# Patient Record
Sex: MALE | Race: OTHER | Employment: FULL TIME | URBAN - METROPOLITAN AREA
[De-identification: names, ages, dates, MRNs, and addresses within clinical notes are randomized per-mention and may not be internally consistent; named-entity substitution may affect disease eponyms.]

---

## 2018-12-22 ENCOUNTER — HOSPITAL ENCOUNTER (EMERGENCY)
Age: 28
Discharge: HOME | End: 2018-12-22
Attending: EMERGENCY MEDICINE
Payer: SELF-PAY

## 2018-12-22 VITALS
RESPIRATION RATE: 16 BRPM | HEART RATE: 71 BPM | HEIGHT: 65 IN | DIASTOLIC BLOOD PRESSURE: 79 MMHG | OXYGEN SATURATION: 98 % | TEMPERATURE: 97.9 F | SYSTOLIC BLOOD PRESSURE: 134 MMHG | WEIGHT: 181.22 LBS | BODY MASS INDEX: 30.19 KG/M2

## 2018-12-22 DIAGNOSIS — H18.892 CORNEAL RUST RING OF LEFT EYE: ICD-10-CM

## 2018-12-22 DIAGNOSIS — T15.92XA FOREIGN BODY OF LEFT EYE, INITIAL ENCOUNTER: Primary | ICD-10-CM

## 2018-12-22 PROCEDURE — 99282 EMERGENCY DEPT VISIT SF MDM: CPT

## 2018-12-22 PROCEDURE — 74011000250 HC RX REV CODE- 250: Performed by: NURSE PRACTITIONER

## 2018-12-22 PROCEDURE — 75810000285 HC RMVL FB EYE W/ OR W/O SLIT LAMP

## 2018-12-22 RX ORDER — TETRACAINE HYDROCHLORIDE 5 MG/ML
1 SOLUTION OPHTHALMIC
Status: COMPLETED | OUTPATIENT
Start: 2018-12-22 | End: 2018-12-22

## 2018-12-22 RX ORDER — ERYTHROMYCIN 5 MG/G
OINTMENT OPHTHALMIC
Qty: 1 TUBE | Refills: 0 | Status: SHIPPED | OUTPATIENT
Start: 2018-12-22 | End: 2018-12-29

## 2018-12-22 RX ADMIN — TETRACAINE HYDROCHLORIDE 1 DROP: 5 SOLUTION OPHTHALMIC at 15:12

## 2018-12-22 RX ADMIN — FLUORESCEIN SODIUM 1 STRIP: 1 STRIP OPHTHALMIC at 15:12

## 2018-12-22 NOTE — ED TRIAGE NOTES
TRIAGE NOTE: Patient was cutting metal yesterday without safety goggles and felt metal go into left eye. +redness & pain

## 2018-12-22 NOTE — DISCHARGE INSTRUCTIONS
Objeto en el angel: Instrucciones de cuidado - [ Object in the Eye: Care Instructions ]  Instrucciones de cuidado  Es común que kriss partícula de polvo o un objeto pequeño, tita kriss pestaña o un insecto, entren en el angel. Por lo general, las lágrimas eliminan el objeto. Aurelio el objeto puede raspar la superficie del angel (córnea). Si la superficie del angel se rasguña, se puede sentir tita si todavía hubiera algo en el angel. Carliss Kale de los rasguños son leves y se curan por sí solos en rafael o [place]. Si el objeto aún se encontraba en patel angel, probablemente patel médico lo retiró [employer]. En ocasiones estos objetos quedan atrapados profundamente en el angel y requieren [facility]. Por ejemplo, un objeto metálico podría dejar un anillo de óxido. La atención de seguimiento es kriss parte clave de patel tratamiento y seguridad. Asegúrese de hacer y acudir a todas las citas, y llame a patel médico si está teniendo problemas. También es kriss buena idea saber los resultados de missael exámenes y mantener kriss lista de los medicamentos que milena. ¿Cómo puede cuidarse en el hogar? · Probablemente el médico usó algún medicamento para adormecer el angel. Cuando pasa patel efecto en cuestión de 30 a 60 minutos, el dolor en el angel podría regresar. [place] un analgésico (medicamento para el dolor) de venta manan, tita acetaminofén (Tylenol), ibuprofeno (Advil, Motrin) o naproxeno (Aleve), cuando sea necesario. Olivia y siga todas las instrucciones de la [place]. · No tome dos o más analgésicos al [employer], a menos que el médico se lo haya indicado. Muchos analgésicos contienen acetaminofén, es decir, Tylenol. El exceso de acetaminofén (Tylenol) puede ser dañino. · Si patel médico le recetó antibióticos, tómelos según las indicaciones. No deje de tomarlos por el hecho de sentirse mejor. Debe joss todos los antibióticos hasta terminarlos. · Es posible que el médico le haya colocado un parche sobre el angel lastimado.  Si es así, [place] angel cerrado bajo el parche. [place] hará que el angel se sienta mejor. No retire el parche hasta que patel médico se lo indique. · Si no tiene un parche, mantenga el angel lesionado cerrado para reducir el dolor. · [employer] las jasbir antes de tocarse el angel. · No se frote el angel lesionado. Frotarlo puede empeorarlo. · Use las gotas o la pomada para los ojos recetadas según las indicaciones. Asegúrese de que la punta del gotero o del frasco esté limpia. · Para ponerse pomada o gotas para los ojos:  ? Incline la [place] atrás y con un dedo baje el párpado inferior. ? Deje caer las gotas o un chorrito del medicamento dentro del párpado inferior. ? Cierre el angel sabine 30 a 61 segundos para permitir que las gotas o la pomada se esparzan. ? No permita que la punta de la pomada o del gotero toque missael pestañas ni otra superficie. · No use lentes de contacto en el angel lastimado hasta que patel médico le indique que puede [place]. Además, no use maquillaje para ojos hasta que el angel sane. · No conduzca si está usando un parche. No puede calcular susan las distancias. · [address] 24 a 50 horas, limite la lectura y otras tareas que requieran mucho movimiento del angel. · La john brillante podría provocar dolor. Podría ser útil el uso de lentes de sol. · Para prevenir futuras lesiones en los ojos, use lentes de seguridad o de protección cuando trabaje con máquinas o herramientas, soha el pasto o monte en bicicleta o motocicleta. ¿Cuándo debe pedir ayuda? Llame al 911 en cualquier momento que considere que necesita atención de [place]. Por ejemplo, llame si:    · De pronto no puede ventura o apenas puede ventura.    Llame a patel médico ahora mismo o busque atención médica inmediata si:    · Tiene señales de infección en el angel, tales tita:  ? Pus o secreción espesa que sale del angel. ? Enrojecimiento o hinchazón alrededor del angel.   ? Mertha Signs.     · Tiene un nuevo dolor en el angel o el dolor empeora.     · Siente tita si tuviera arena en el angel al parpadear.    Preste especial atención a los cambios en paetl hope y asegúrese de comunicarse con patel médico si:    · No está mejorando después de 1 día (24 horas). ¿Dónde puede encontrar más información en inglés? Norbert Francis a [email or web address]-[email or web address] Anderson Valdovinos O900 en la búsqueda para aprender más acerca de \"Objeto en el angel: Instrucciones de cuidado - [ Object in the Eye: Care Instructions ]. \"  Revisado: 20 noviembre, 2017  Versión del contenido: 11.8  © 8490-5636 [employer]wise[employer]. Las instrucciones de cuidado fueron adaptadas bajo licencia por [employer] (which disclaims liability or warranty for this information). Si usted tiene [place] afección médica o sobre estas instrucciones, siempre pregunte a patel profesional de hope. Healthwise, Incorporated niega toda garantía o responsabilidad por patel uso de esta información.

## 2018-12-22 NOTE — ED PROVIDER NOTES
Pt is a 30 y/o male with no pmh who presents with c/o left eye. States he was grinding a piece of metal with no goggles and felt something go in his eye. Has not been seen for this. He tried eye drops with no improvement. No other complaints. No past medical history on file. No past surgical history on file. No family history on file. Social History     Socioeconomic History    Marital status: Not on file     Spouse name: Not on file    Number of children: Not on file    Years of education: Not on file    Highest education level: Not on file   Social Needs    Financial resource strain: Not on file    Food insecurity - worry: Not on file    Food insecurity - inability: Not on file    Transportation needs - medical: Not on file   Mia[place]Ã© needs - non-medical: Not on file   Occupational History    Not on file   Tobacco Use    Smoking status: Not on file   Substance and Sexual Activity    Alcohol use: Not on file    Drug use: Not on file    Sexual activity: Not on file   Other Topics Concern    Not on file   Social History Narrative    Not on file         ALLERGIES: Patient has no allergy information on record. Review of Systems   Constitutional: Negative for chills and fever. Eyes: Positive for photophobia, pain, redness and visual disturbance. Respiratory: Negative. Cardiovascular: Negative. All other systems reviewed and are negative. Vitals:    12/22/18 1445   Pulse: 70   SpO2: 99%            Physical Exam   Constitutional: He is oriented to person, place, and time. He appears well-developed and well-nourished. Eyes: EOM and lids are normal. Pupils are equal, round, and reactive to light. Lids are everted and swept, no foreign bodies found. Right conjunctiva is injected. Right conjunctiva has no hemorrhage. Left conjunctiva is injected. Left conjunctiva has no hemorrhage. Metal foreign body at 3 o'clock to left eye. No other uptake noted. Neck: Normal range of motion. Pulmonary/Chest: Effort normal.   Musculoskeletal: Normal range of motion. Neurological: He is alert and oriented to person, place, and time. Skin: Skin is warm and dry. Psychiatric: He has a normal mood and affect. His behavior is normal. Judgment and thought content normal.        MDM  Number of Diagnoses or Management Options  Corneal rust ring of left eye:   Foreign body of left eye, initial encounter:   Diagnosis management comments: Pt is a 30 y/o male with a FB to left cornea that was removed with a moist cottonswab. Examination of the eye shows a small rust ring but not other findings. NO vision complaints. Pain better after removal. Will start on erythromycin ointment and I have given him follow up instructions for the [facility] for evaluation in 2 days. Pt and his cousin both vebalize understanding of this plan. Eye Stain    Date/Time: 12/22/2018 3:28 PM    Performed by: NP    . Foreign body removed. Residual rust ring was observed   Dressing used: antibiotic ointment    Corneal abrasion was not present on eyelid eversion. Cornea is clear. Patient tolerance: Patient tolerated the procedure well with no immediate complications  My total time at bedside, performing this procedure was 1-15 minutes.